# Patient Record
Sex: MALE | Race: BLACK OR AFRICAN AMERICAN | NOT HISPANIC OR LATINO | URBAN - METROPOLITAN AREA
[De-identification: names, ages, dates, MRNs, and addresses within clinical notes are randomized per-mention and may not be internally consistent; named-entity substitution may affect disease eponyms.]

---

## 2017-07-18 ENCOUNTER — OUTPATIENT (OUTPATIENT)
Dept: OUTPATIENT SERVICES | Facility: HOSPITAL | Age: 57
LOS: 1 days | Discharge: HOME | End: 2017-07-18

## 2017-07-18 DIAGNOSIS — I45.81 LONG QT SYNDROME: ICD-10-CM

## 2017-07-18 DIAGNOSIS — F11.20 OPIOID DEPENDENCE, UNCOMPLICATED: ICD-10-CM

## 2017-08-31 ENCOUNTER — OUTPATIENT (OUTPATIENT)
Dept: OUTPATIENT SERVICES | Facility: HOSPITAL | Age: 57
LOS: 1 days | Discharge: HOME | End: 2017-08-31

## 2017-08-31 DIAGNOSIS — I45.81 LONG QT SYNDROME: ICD-10-CM

## 2017-10-25 ENCOUNTER — OUTPATIENT (OUTPATIENT)
Dept: OUTPATIENT SERVICES | Facility: HOSPITAL | Age: 57
LOS: 1 days | Discharge: HOME | End: 2017-10-25

## 2017-10-25 DIAGNOSIS — I45.81 LONG QT SYNDROME: ICD-10-CM

## 2017-11-07 ENCOUNTER — OUTPATIENT (OUTPATIENT)
Dept: OUTPATIENT SERVICES | Facility: HOSPITAL | Age: 57
LOS: 1 days | Discharge: HOME | End: 2017-11-07

## 2017-11-07 DIAGNOSIS — I45.81 LONG QT SYNDROME: ICD-10-CM

## 2019-06-22 ENCOUNTER — EMERGENCY (EMERGENCY)
Facility: HOSPITAL | Age: 59
LOS: 0 days | Discharge: HOME | End: 2019-06-22
Attending: EMERGENCY MEDICINE | Admitting: EMERGENCY MEDICINE
Payer: MEDICARE

## 2019-06-22 VITALS
HEART RATE: 57 BPM | RESPIRATION RATE: 18 BRPM | SYSTOLIC BLOOD PRESSURE: 112 MMHG | TEMPERATURE: 98 F | DIASTOLIC BLOOD PRESSURE: 59 MMHG | OXYGEN SATURATION: 98 %

## 2019-06-22 VITALS
WEIGHT: 160.06 LBS | SYSTOLIC BLOOD PRESSURE: 115 MMHG | RESPIRATION RATE: 16 BRPM | OXYGEN SATURATION: 100 % | DIASTOLIC BLOOD PRESSURE: 64 MMHG | TEMPERATURE: 98 F | HEART RATE: 72 BPM

## 2019-06-22 DIAGNOSIS — K40.90 UNILATERAL INGUINAL HERNIA, WITHOUT OBSTRUCTION OR GANGRENE, NOT SPECIFIED AS RECURRENT: ICD-10-CM

## 2019-06-22 LAB
ALBUMIN SERPL ELPH-MCNC: 3.7 G/DL — SIGNIFICANT CHANGE UP (ref 3.5–5.2)
ALP SERPL-CCNC: 83 U/L — SIGNIFICANT CHANGE UP (ref 30–115)
ALT FLD-CCNC: 26 U/L — SIGNIFICANT CHANGE UP (ref 0–41)
ANION GAP SERPL CALC-SCNC: 10 MMOL/L — SIGNIFICANT CHANGE UP (ref 7–14)
APPEARANCE UR: CLEAR — SIGNIFICANT CHANGE UP
AST SERPL-CCNC: 28 U/L — SIGNIFICANT CHANGE UP (ref 0–41)
BASOPHILS # BLD AUTO: 0.02 K/UL — SIGNIFICANT CHANGE UP (ref 0–0.2)
BASOPHILS NFR BLD AUTO: 0.4 % — SIGNIFICANT CHANGE UP (ref 0–1)
BILIRUB DIRECT SERPL-MCNC: <0.2 MG/DL — SIGNIFICANT CHANGE UP (ref 0–0.2)
BILIRUB INDIRECT FLD-MCNC: >0 MG/DL — LOW (ref 0.2–1.2)
BILIRUB SERPL-MCNC: 0.2 MG/DL — SIGNIFICANT CHANGE UP (ref 0.2–1.2)
BILIRUB UR-MCNC: NEGATIVE — SIGNIFICANT CHANGE UP
BUN SERPL-MCNC: 20 MG/DL — SIGNIFICANT CHANGE UP (ref 10–20)
CALCIUM SERPL-MCNC: 9.4 MG/DL — SIGNIFICANT CHANGE UP (ref 8.5–10.1)
CHLORIDE SERPL-SCNC: 100 MMOL/L — SIGNIFICANT CHANGE UP (ref 98–110)
CO2 SERPL-SCNC: 30 MMOL/L — SIGNIFICANT CHANGE UP (ref 17–32)
COLOR SPEC: YELLOW — SIGNIFICANT CHANGE UP
CREAT SERPL-MCNC: 0.9 MG/DL — SIGNIFICANT CHANGE UP (ref 0.7–1.5)
DIFF PNL FLD: NEGATIVE — SIGNIFICANT CHANGE UP
EOSINOPHIL # BLD AUTO: 0.16 K/UL — SIGNIFICANT CHANGE UP (ref 0–0.7)
EOSINOPHIL NFR BLD AUTO: 3.2 % — SIGNIFICANT CHANGE UP (ref 0–8)
GLUCOSE SERPL-MCNC: 93 MG/DL — SIGNIFICANT CHANGE UP (ref 70–99)
GLUCOSE UR QL: NEGATIVE MG/DL — SIGNIFICANT CHANGE UP
HCT VFR BLD CALC: 38.4 % — LOW (ref 42–52)
HGB BLD-MCNC: 11.8 G/DL — LOW (ref 14–18)
IMM GRANULOCYTES NFR BLD AUTO: 0.4 % — HIGH (ref 0.1–0.3)
KETONES UR-MCNC: NEGATIVE — SIGNIFICANT CHANGE UP
LACTATE SERPL-SCNC: 1 MMOL/L — SIGNIFICANT CHANGE UP (ref 0.5–2.2)
LEUKOCYTE ESTERASE UR-ACNC: NEGATIVE — SIGNIFICANT CHANGE UP
LIDOCAIN IGE QN: 29 U/L — SIGNIFICANT CHANGE UP (ref 7–60)
LYMPHOCYTES # BLD AUTO: 2.7 K/UL — SIGNIFICANT CHANGE UP (ref 1.2–3.4)
LYMPHOCYTES # BLD AUTO: 53.5 % — HIGH (ref 20.5–51.1)
MCHC RBC-ENTMCNC: 28.3 PG — SIGNIFICANT CHANGE UP (ref 27–31)
MCHC RBC-ENTMCNC: 30.7 G/DL — LOW (ref 32–37)
MCV RBC AUTO: 92.1 FL — SIGNIFICANT CHANGE UP (ref 80–94)
MONOCYTES # BLD AUTO: 0.61 K/UL — HIGH (ref 0.1–0.6)
MONOCYTES NFR BLD AUTO: 12.1 % — HIGH (ref 1.7–9.3)
NEUTROPHILS # BLD AUTO: 1.54 K/UL — SIGNIFICANT CHANGE UP (ref 1.4–6.5)
NEUTROPHILS NFR BLD AUTO: 30.4 % — LOW (ref 42.2–75.2)
NITRITE UR-MCNC: NEGATIVE — SIGNIFICANT CHANGE UP
NRBC # BLD: 0 /100 WBCS — SIGNIFICANT CHANGE UP (ref 0–0)
PH UR: 7.5 — SIGNIFICANT CHANGE UP (ref 5–8)
PLATELET # BLD AUTO: 297 K/UL — SIGNIFICANT CHANGE UP (ref 130–400)
POTASSIUM SERPL-MCNC: 4.8 MMOL/L — SIGNIFICANT CHANGE UP (ref 3.5–5)
POTASSIUM SERPL-SCNC: 4.8 MMOL/L — SIGNIFICANT CHANGE UP (ref 3.5–5)
PROT SERPL-MCNC: 6.6 G/DL — SIGNIFICANT CHANGE UP (ref 6–8)
PROT UR-MCNC: NEGATIVE MG/DL — SIGNIFICANT CHANGE UP
RBC # BLD: 4.17 M/UL — LOW (ref 4.7–6.1)
RBC # FLD: 13.9 % — SIGNIFICANT CHANGE UP (ref 11.5–14.5)
SODIUM SERPL-SCNC: 140 MMOL/L — SIGNIFICANT CHANGE UP (ref 135–146)
SP GR SPEC: 1.01 — SIGNIFICANT CHANGE UP (ref 1.01–1.03)
UROBILINOGEN FLD QL: 0.2 MG/DL — SIGNIFICANT CHANGE UP (ref 0.2–0.2)
WBC # BLD: 5.05 K/UL — SIGNIFICANT CHANGE UP (ref 4.8–10.8)
WBC # FLD AUTO: 5.05 K/UL — SIGNIFICANT CHANGE UP (ref 4.8–10.8)

## 2019-06-22 PROCEDURE — 73630 X-RAY EXAM OF FOOT: CPT | Mod: 26,50

## 2019-06-22 PROCEDURE — 73610 X-RAY EXAM OF ANKLE: CPT | Mod: 26,LT

## 2019-06-22 PROCEDURE — 99284 EMERGENCY DEPT VISIT MOD MDM: CPT | Mod: 25

## 2019-06-22 PROCEDURE — 74177 CT ABD & PELVIS W/CONTRAST: CPT | Mod: 26

## 2019-06-22 RX ORDER — SODIUM CHLORIDE 9 MG/ML
1000 INJECTION INTRAMUSCULAR; INTRAVENOUS; SUBCUTANEOUS
Refills: 0 | Status: DISCONTINUED | OUTPATIENT
Start: 2019-06-22 | End: 2019-06-22

## 2019-06-22 RX ADMIN — SODIUM CHLORIDE 250 MILLILITER(S): 9 INJECTION INTRAMUSCULAR; INTRAVENOUS; SUBCUTANEOUS at 05:16

## 2019-06-22 RX ADMIN — SODIUM CHLORIDE 1000 MILLILITER(S): 9 INJECTION INTRAMUSCULAR; INTRAVENOUS; SUBCUTANEOUS at 09:30

## 2019-06-22 NOTE — ED ADULT NURSE NOTE - NSIMPLEMENTINTERV_GEN_ALL_ED
Implemented All Fall Risk Interventions:  [place] to call system. Call bell, personal items and telephone within reach. Instruct patient to call for assistance. Room bathroom lighting operational. Non-slip footwear when patient is off stretcher. Physically safe environment: no spills, clutter or unnecessary equipment. Stretcher in lowest position, wheels locked, appropriate side rails in place. Provide visual cue, wrist band, yellow gown, etc. Monitor gait and stability. Monitor for mental status changes and reorient to person, place, and time. Review medications for side effects contributing to fall risk. Reinforce activity limits and safety measures with patient and family.

## 2019-06-22 NOTE — ED PROVIDER NOTE - PHYSICAL EXAMINATION
CONSTITUTIONAL: Well-appearing; well-nourished; in no apparent distress.   EYES: PERRL; EOM intact.   NECK: Supple; non-tender; no cervical lymphadenopathy.   CARDIOVASCULAR: Normal S1, S2; no murmurs, rubs, or gallops.   RESPIRATORY: Normal chest excursion with respiration; breath sounds clear and equal bilaterally; no wheezes, rhonchi, or rales.  GI/: Normal bowel sounds; non-distended; + tender mass noted over L inguinal region. No testicular masses/tenderness. Non tender abdomen. No rebound or guarding. No CVA ttp  MS: No evidence of trauma or deformity.  Normal ROM in all four extremities; non-tender to palpation; distal pulses are normal.   SKIN: Normal for age and race; warm; No cellulitis changes/erythema/warmth  NEURO/PSYCH: A & O x 4; grossly unremarkable. mood and manner are appropriate. Grooming and personal hygiene are appropriate.

## 2019-06-22 NOTE — ED PROVIDER NOTE - TOBACCO USE
Problem: Patient Care Overview (Adult)  Goal: Plan of Care Review  Outcome: Ongoing (interventions implemented as appropriate)       Unknown if ever smoked

## 2019-06-22 NOTE — ED ADULT NURSE REASSESSMENT NOTE - NS ED NURSE REASSESS COMMENT FT1
Pt returned from CT in stable condition. IV fluids running. Urine sent to lab. XRay pending. Will continue to monitor.

## 2019-06-22 NOTE — ED PROVIDER NOTE - OBJECTIVE STATEMENT
59 year old M with hx of hep c, opioid abuse sent in by [facility] for evaluation of L inguinal hernia. Pt sts has had hernia x several months and is having worsening pain. His symptoms are worse with standing and better with laying down. He denies any abdominal/testicular pain, past surgeries, nausea, vomiting, bowel/bladder incontinence, skin changes/redness, fever/chills, chest pain, sob.

## 2019-06-22 NOTE — ED ADULT TRIAGE NOTE - CHIEF COMPLAINT QUOTE
pt BIBA for inguinal hernia, sent by [facility] for medical evaluation of hernia. EMS stated that pt "still has infection in hip"

## 2019-06-22 NOTE — ED PROVIDER NOTE - CLINICAL SUMMARY MEDICAL DECISION MAKING FREE TEXT BOX
CT no inguinal hernia, chronic L hip changes, pt ambulating independently, on my exam, pt has no inguinal hernia or tenderness, no masses, states it goes in and out, will give surgery clinic and ortho f/u as well, strict return precautions provided.

## 2019-06-22 NOTE — ED PROVIDER NOTE - ATTENDING CONTRIBUTION TO CARE
60 yo M with pmh of hep c, narcotic abuse sent in by [facility] for evaluation of L inguinal hernia.  pt says has been increasing in size and pain for a few months.  denies obstipation.  no n/v/d.  no fever.  exam: nad, ncat, perrl, eomi, mmm, rrr, ctab, abd soft, left inguinal hernia, palpable, reducible when laying down, ttp,nd aox3, imp: pt with left inguinal hernia, CT, surg consult

## 2019-06-22 NOTE — ED ADULT NURSE NOTE - CHIEF COMPLAINT QUOTE
pt BIBA for inguinal hernia, sent by [facility] for medical evaluation of hernia. EMS stated that pt "still has infection in hip"

## 2019-06-22 NOTE — ED PROVIDER NOTE - NSFOLLOWUPCLINICS_GEN_ALL_ED_FT
[facility]  Orthpedic  [address]  [place], [place]   Phone: [phone]  Fax:     [facility]  Surgery  [address] [place] [address]  Phone: [phone]  Fax:   Follow Up Time:

## 2019-06-22 NOTE — ED PROVIDER NOTE - NS ED ROS FT
Constitutional: no fever, chills, no recent weight loss, change in appetite or malaise  Eyes: no redness/discharge/pain/vision changes  Cardiac: No chest pain, SOB or edema.  Respiratory: No cough or respiratory distress  GI: + L inguinal pain. No nausea, vomiting, diarrhea or abdominal pain.  : No dysuria, frequency, urgency or hematuria  MS: no pain to back or extremities, no loss of ROM, no weakness  Neuro: No headache or weakness. No LOC.  Skin: No skin rash.  Except as documented in the HPI, all other systems are negative.

## 2019-06-23 PROCEDURE — 73502 X-RAY EXAM HIP UNI 2-3 VIEWS: CPT | Mod: 26,LT

## 2019-07-01 ENCOUNTER — EMERGENCY (EMERGENCY)
Facility: HOSPITAL | Age: 59
LOS: 0 days | Discharge: HOME | End: 2019-07-02
Attending: EMERGENCY MEDICINE | Admitting: EMERGENCY MEDICINE
Payer: MEDICARE

## 2019-07-01 VITALS
DIASTOLIC BLOOD PRESSURE: 70 MMHG | TEMPERATURE: 98 F | HEART RATE: 71 BPM | SYSTOLIC BLOOD PRESSURE: 120 MMHG | OXYGEN SATURATION: 99 % | RESPIRATION RATE: 20 BRPM

## 2019-07-01 DIAGNOSIS — R10.32 LEFT LOWER QUADRANT PAIN: ICD-10-CM

## 2019-07-01 DIAGNOSIS — K40.90 UNILATERAL INGUINAL HERNIA, WITHOUT OBSTRUCTION OR GANGRENE, NOT SPECIFIED AS RECURRENT: ICD-10-CM

## 2019-07-01 PROCEDURE — 99284 EMERGENCY DEPT VISIT MOD MDM: CPT

## 2019-07-01 PROCEDURE — 99053 MED SERV 10PM-8AM 24 HR FAC: CPT

## 2019-07-02 LAB
ALBUMIN SERPL ELPH-MCNC: 3.6 G/DL — SIGNIFICANT CHANGE UP (ref 3.5–5.2)
ALP SERPL-CCNC: 97 U/L — SIGNIFICANT CHANGE UP (ref 30–115)
ALT FLD-CCNC: 24 U/L — SIGNIFICANT CHANGE UP (ref 0–41)
ANION GAP SERPL CALC-SCNC: 10 MMOL/L — SIGNIFICANT CHANGE UP (ref 7–14)
APPEARANCE UR: CLEAR — SIGNIFICANT CHANGE UP
AST SERPL-CCNC: 24 U/L — SIGNIFICANT CHANGE UP (ref 0–41)
BACTERIA # UR AUTO: ABNORMAL /HPF
BASOPHILS # BLD AUTO: 0.02 K/UL — SIGNIFICANT CHANGE UP (ref 0–0.2)
BASOPHILS NFR BLD AUTO: 0.4 % — SIGNIFICANT CHANGE UP (ref 0–1)
BILIRUB SERPL-MCNC: 0.3 MG/DL — SIGNIFICANT CHANGE UP (ref 0.2–1.2)
BILIRUB UR-MCNC: NEGATIVE — SIGNIFICANT CHANGE UP
BUN SERPL-MCNC: 22 MG/DL — HIGH (ref 10–20)
CALCIUM SERPL-MCNC: 9.4 MG/DL — SIGNIFICANT CHANGE UP (ref 8.5–10.1)
CHLORIDE SERPL-SCNC: 103 MMOL/L — SIGNIFICANT CHANGE UP (ref 98–110)
CO2 SERPL-SCNC: 26 MMOL/L — SIGNIFICANT CHANGE UP (ref 17–32)
COLOR SPEC: YELLOW — SIGNIFICANT CHANGE UP
CREAT SERPL-MCNC: 0.9 MG/DL — SIGNIFICANT CHANGE UP (ref 0.7–1.5)
DIFF PNL FLD: NEGATIVE — SIGNIFICANT CHANGE UP
EOSINOPHIL # BLD AUTO: 0.16 K/UL — SIGNIFICANT CHANGE UP (ref 0–0.7)
EOSINOPHIL NFR BLD AUTO: 3.1 % — SIGNIFICANT CHANGE UP (ref 0–8)
GLUCOSE SERPL-MCNC: 98 MG/DL — SIGNIFICANT CHANGE UP (ref 70–99)
GLUCOSE UR QL: NEGATIVE — SIGNIFICANT CHANGE UP
HCT VFR BLD CALC: 36.1 % — LOW (ref 42–52)
HGB BLD-MCNC: 11.6 G/DL — LOW (ref 14–18)
IMM GRANULOCYTES NFR BLD AUTO: 0.4 % — HIGH (ref 0.1–0.3)
KETONES UR-MCNC: NEGATIVE — SIGNIFICANT CHANGE UP
LACTATE SERPL-SCNC: 0.7 MMOL/L — SIGNIFICANT CHANGE UP (ref 0.5–2.2)
LEUKOCYTE ESTERASE UR-ACNC: ABNORMAL
LYMPHOCYTES # BLD AUTO: 2.44 K/UL — SIGNIFICANT CHANGE UP (ref 1.2–3.4)
LYMPHOCYTES # BLD AUTO: 46.6 % — SIGNIFICANT CHANGE UP (ref 20.5–51.1)
MCHC RBC-ENTMCNC: 29.5 PG — SIGNIFICANT CHANGE UP (ref 27–31)
MCHC RBC-ENTMCNC: 32.1 G/DL — SIGNIFICANT CHANGE UP (ref 32–37)
MCV RBC AUTO: 91.9 FL — SIGNIFICANT CHANGE UP (ref 80–94)
MONOCYTES # BLD AUTO: 0.62 K/UL — HIGH (ref 0.1–0.6)
MONOCYTES NFR BLD AUTO: 11.8 % — HIGH (ref 1.7–9.3)
NEUTROPHILS # BLD AUTO: 1.98 K/UL — SIGNIFICANT CHANGE UP (ref 1.4–6.5)
NEUTROPHILS NFR BLD AUTO: 37.7 % — LOW (ref 42.2–75.2)
NITRITE UR-MCNC: NEGATIVE — SIGNIFICANT CHANGE UP
NRBC # BLD: 0 /100 WBCS — SIGNIFICANT CHANGE UP (ref 0–0)
PH UR: 6 — SIGNIFICANT CHANGE UP (ref 5–8)
PLATELET # BLD AUTO: 301 K/UL — SIGNIFICANT CHANGE UP (ref 130–400)
POTASSIUM SERPL-MCNC: 4.7 MMOL/L — SIGNIFICANT CHANGE UP (ref 3.5–5)
POTASSIUM SERPL-SCNC: 4.7 MMOL/L — SIGNIFICANT CHANGE UP (ref 3.5–5)
PROT SERPL-MCNC: 6.3 G/DL — SIGNIFICANT CHANGE UP (ref 6–8)
PROT UR-MCNC: NEGATIVE — SIGNIFICANT CHANGE UP
RBC # BLD: 3.93 M/UL — LOW (ref 4.7–6.1)
RBC # FLD: 14.5 % — SIGNIFICANT CHANGE UP (ref 11.5–14.5)
SODIUM SERPL-SCNC: 139 MMOL/L — SIGNIFICANT CHANGE UP (ref 135–146)
SP GR SPEC: >=1.03 — SIGNIFICANT CHANGE UP (ref 1.01–1.03)
UROBILINOGEN FLD QL: 1 (ref 0.2–0.2)
WBC # BLD: 5.24 K/UL — SIGNIFICANT CHANGE UP (ref 4.8–10.8)
WBC # FLD AUTO: 5.24 K/UL — SIGNIFICANT CHANGE UP (ref 4.8–10.8)
WBC UR QL: SIGNIFICANT CHANGE UP /HPF

## 2019-07-02 PROCEDURE — 76870 US EXAM SCROTUM: CPT | Mod: 26

## 2019-07-02 PROCEDURE — 74177 CT ABD & PELVIS W/CONTRAST: CPT | Mod: 26

## 2019-07-02 NOTE — ED PROVIDER NOTE - NSFOLLOWUPINSTRUCTIONS_ED_ALL_ED_FT
PLEASE FOLLOW-UP AT [facility] AS SCHEDULED ON 7/11/19.    Hernia, Adult    A hernia is the bulging of an organ or tissue through a weak spot in the muscles of the abdomen (abdominal wall). Hernias develop most often near the navel or groin.    There are many kinds of hernias. Common kinds include:    Femoral hernia. This kind of hernia develops under the groin in the upper thigh area.  Inguinal hernia. This kind of hernia develops in the groin or scrotum.  Umbilical hernia. This kind of hernia develops near the navel.  Hiatal hernia. This kind of hernia causes part of the stomach to be pushed up into the chest.  Incisional hernia. This kind of hernia bulges through a scar from an abdominal surgery.     CAUSES  This condition may be caused by:    Heavy lifting.  Coughing over a long period of time.  Straining to have a bowel movement.  An incision made during an abdominal surgery.   A birth defect (congenital defect).  Excess weight or obesity.  Smoking.  Poor nutrition.  Cystic fibrosis.  Excess fluid in the abdomen.  Undescended testicles.    SYMPTOMS  Symptoms of a hernia include:    A lump on the abdomen. This is the first sign of a hernia. The lump may become more obvious with standing, straining, or coughing. It may get bigger over time if it is not treated or if the condition causing it is not treated.  Pain. A hernia is usually painless, but it may become painful over time if treatment is delayed. The pain is usually dull and may get worse with standing or lifting heavy objects.    Sometimes a hernia gets tightly squeezed in the weak spot (strangulated) or stuck there (incarcerated) and causes additional symptoms. These symptoms may include:    Vomiting.  Nausea.  Constipation.  Irritability.    DIAGNOSIS  A hernia may be diagnosed with:    A physical exam. During the exam your health care provider may ask you to cough or to make a specific movement, because a hernia is usually more visible when you move.  Imaging tests. These can include:  X-rays.  Ultrasound.  CT scan.    TREATMENT  A hernia that is small and painless may not need to be treated. A hernia that is large or painful may be treated with surgery. Inguinal hernias may be treated with surgery to prevent incarceration or strangulation. Strangulated hernias are always treated with surgery, because lack of blood to the trapped organ or tissue can cause it to die.    Surgery to treat a hernia involves pushing the bulge back into place and repairing the weak part of the abdomen.    HOME CARE INSTRUCTIONS  Avoid straining.  Do not lift anything heavier than 10 lb (4.5 kg).  Lift with your leg muscles, not your back muscles. This helps avoid strain.  When coughing, try to cough gently.  Prevent constipation. Constipation leads to straining with bowel movements, which can make a hernia worse or cause a hernia repair to break down. You can prevent constipation by:  Eating a high-fiber diet that includes plenty of fruits and vegetables.  Drinking enough fluids to keep your urine clear or pale yellow. Aim to drink 6–8 glasses of water per day.  Using a stool softener as directed by your health care provider.  Lose weight, if you are overweight.  Do not use any tobacco products, including cigarettes, chewing tobacco, or electronic cigarettes. If you need help quitting, ask your health care provider.  Keep all follow-up visits as directed by your health care provider. This is important. Your health care provider may need to monitor your condition.    SEEK MEDICAL CARE IF:  You have swelling, redness, and pain in the affected area.  Your bowel habits change.    SEEK IMMEDIATE MEDICAL CARE IF:  You have a fever.  You have abdominal pain that is getting worse.  You feel nauseous or you vomit.  You cannot push the hernia back in place by gently pressing on it while you are lying down.  The hernia:  Changes in shape or size.  Is stuck outside the abdomen.  Becomes discolored.  Feels hard or tender.    ADDITIONAL NOTES AND INSTRUCTIONS    Please follow up with your Primary MD in 24-48 hr.  Seek immediate medical care for any new/worsening signs or symptoms.

## 2019-07-02 NOTE — ED PROVIDER NOTE - NSFOLLOWUPCLINICS_GEN_ALL_ED_FT
[facility]  Surgery  [address] [place] [address]  Phone: [phone]  Fax:   Follow Up Time:

## 2019-07-02 NOTE — ED ADULT NURSE NOTE - OBJECTIVE STATEMENT
Pt is C/O back pains that radiates to his left leg and groin for 1 month. Pt said he has hernia in the L groin for long time and is very painful now, was refer from [facility]. no N/V

## 2019-07-02 NOTE — ED PROVIDER NOTE - OBJECTIVE STATEMENT
59y m from [facility] p/w L groin pain x sev days. Has known hernia, with outpt Surg Clinic f/u appx this month. C/o worsening pain to area x few days, referred to ED for further mgmt. Denies f/c, cp/sob, nvd, flank pain, urinary sx, penile discharge or rash.

## 2019-07-02 NOTE — ED PROVIDER NOTE - PHYSICAL EXAMINATION
VITAL SIGNS: I have reviewed nursing notes and confirm.  CONSTITUTIONAL: Well-developed; well-nourished; in no acute distress.  SKIN: Skin exam is warm and dry, no acute rash.  HEAD: Normocephalic; atraumatic.  EYES: PERRL, EOM intact; conjunctiva and sclera clear.  ENT: No nasal discharge; airway clear.  NECK: Supple; non tender.  CARD: S1, S2 normal; no murmurs, gallops, or rubs. Regular rate and rhythm.  RESP: No wheezes, rales or rhonchi.  ABD: Normal bowel sounds; soft; non-distended; +L inguinal ttp; no palpable hepatosplenomegaly or masses, no rgr.  exam wnl, no testicular pain/swelling, no penile discharge or rash, no palpable inguinal hernias, no lad.  BACK: non-tender, no CVAT  EXT: Normal ROM. No clubbing, cyanosis or edema. DPI.  NEURO: Alert, oriented. Grossly unremarkable. No focal deficits.  PSYCH: Cooperative, appropriate.

## 2019-07-02 NOTE — ED PROVIDER NOTE - CLINICAL SUMMARY MEDICAL DECISION MAKING FREE TEXT BOX
L inguinal hernia - results d/w pt & copies given, return precautions discussed, encouraged outpt Surg Clinic f/u this month as previously scheduled

## 2019-07-03 LAB
CULTURE RESULTS: NO GROWTH — SIGNIFICANT CHANGE UP
SPECIMEN SOURCE: SIGNIFICANT CHANGE UP

## 2019-07-08 ENCOUNTER — EMERGENCY (EMERGENCY)
Facility: HOSPITAL | Age: 59
LOS: 0 days | Discharge: HOME | End: 2019-07-08
Attending: EMERGENCY MEDICINE | Admitting: EMERGENCY MEDICINE
Payer: MEDICAID

## 2019-07-08 VITALS
SYSTOLIC BLOOD PRESSURE: 122 MMHG | HEART RATE: 64 BPM | WEIGHT: 160.06 LBS | RESPIRATION RATE: 18 BRPM | DIASTOLIC BLOOD PRESSURE: 76 MMHG | TEMPERATURE: 98 F | OXYGEN SATURATION: 96 % | HEIGHT: 70 IN

## 2019-07-08 DIAGNOSIS — R10.9 UNSPECIFIED ABDOMINAL PAIN: ICD-10-CM

## 2019-07-08 DIAGNOSIS — K40.91 UNILATERAL INGUINAL HERNIA, WITHOUT OBSTRUCTION OR GANGRENE, RECURRENT: ICD-10-CM

## 2019-07-08 PROCEDURE — 99284 EMERGENCY DEPT VISIT MOD MDM: CPT

## 2019-07-08 RX ORDER — KETOROLAC TROMETHAMINE 30 MG/ML
30 SYRINGE (ML) INJECTION ONCE
Refills: 0 | Status: DISCONTINUED | OUTPATIENT
Start: 2019-07-08 | End: 2019-07-08

## 2019-07-08 RX ADMIN — Medication 30 MILLIGRAM(S): at 17:18

## 2019-07-08 NOTE — ED ADULT TRIAGE NOTE - CHIEF COMPLAINT QUOTE
BIBA via [facility] from [facility]- ems states pt has a history of abdominal hernia and pain radiating to the left side of body- states pain is radiating to the left and right side of abdomen and to right leg- pain began 6 months and worsened 1 month

## 2019-07-08 NOTE — ED PROVIDER NOTE - PHYSICAL EXAMINATION
Physical Exam    Vital Signs: I have reviewed the initial vital signs.  Constitutional: well-nourished, appears stated age, no acute distress  Eyes: Conjunctiva pink, Sclera clear,  Cardiovascular: S1 and S2, regular rate, regular rhythm, well-perfused extremities, radial pulses equal and 2+  Respiratory: unlabored respiratory effort, clear to auscultation bilaterally no wheezing, rales and rhonchi  Gastrointestinal: soft, non-tender abdomen, no pulsatile mass, normal bowl sounds  : Chaperoned by rn, nl testicles, no ttp, left inguinal herina reducible, not ttp, no redness, no incarceration or strnagulation  Musculoskeletal: supple neck, no lower extremity edema, no midline tenderness  Integumentary: warm, dry, no rash, no redness to left inguinal region  Neurologic: awake, alert, cranial nerves II-XII grossly intact, extremities’ motor and sensory functions grossly intact

## 2019-07-08 NOTE — ED PROVIDER NOTE - NSFOLLOWUPCLINICS_GEN_ALL_ED_FT
[facility]  Surgery  [address] [place] [address]  Phone: [phone]  Fax:   Follow Up Time: 4-6 Days

## 2019-07-08 NOTE — ED PROVIDER NOTE - CLINICAL SUMMARY MEDICAL DECISION MAKING FREE TEXT BOX
60yo M presenting with recurrent L inguinal hernia pain- soft, reducible, not incarcerated, not strangulated- improved with analgesia. Pt has surgery follow-up on 11th. Patient comfortable with discharge and follow-up outpatient, strict return precautions given. Patient endorses understanding of all of this and aware that they can return at any time for new or concerning symptoms. No further questions or concerns at this time

## 2019-07-08 NOTE — ED PROVIDER NOTE - CARE PLAN
Principal Discharge DX:	Unilateral recurrent inguinal hernia without obstruction or gangrene Principal Discharge DX:	Unilateral recurrent inguinal hernia without obstruction or gangrene  Goal:	f/u surgery

## 2019-07-08 NOTE — ED PROVIDER NOTE - ATTENDING CONTRIBUTION TO CARE
58yo M history of substance abuse currently in rehab presenting with recurrent L inguinal hernia pain- per pt, pain for several months, worse when standing straining, always reducible. Was evaluated 7/2, labs, CT, US, showing small L inguinal hernia, non-complicated. Per pt, nothing new regarding pt, just "wants it taken care of"- has appt with surgery on 7/11. No other complaints- no fevers/chills/n/v/d, chest pain, shortness of breath, abdominal pain, numbness/focal weakness, back pain, dysuria/hematuria.  Constitutional: Well appearing. No acute distress. Non toxic.   Eyes: PERRLA. Extraocular movements intact, no entrapment. Conjunctiva normal.   ENT: No nasal discharge. Moist mucus membranes.  Neck: Supple, non tender, full range of motion.  CV: RRR no murmurs, rubs, or gallops. +S1S2.   Pulm: Clear to auscultation bilaterally. Normal work of breathing.  Abd: soft NT ND +BS.    exam- +L inguinal masses, small, soft, reducible, no overlying skin changes, no penile lesions/tenderness/drainage, no testicular masses, edema, tenderness, +b/l cremasterics intact   Ext: Warm and well perfused x4, moving all extremities, no edema.   Psy: Cooperative, appropriate.   Skin: Warm, dry, no rash  Neuro: CN2-12 grossly intact no sensory or motor deficits throughout, no drift, no ataxia    a/p: L inguinal hernia- not incarcerated or strangulated, soft, reducible- will add compression, analgesia, surgery follow-up

## 2019-07-08 NOTE — ED PROVIDER NOTE - OBJECTIVE STATEMENT
60 yo male, pmh of left inguinal hernia, presents to ed for left groin pain. Pt states hernia present for 6 months, has schedule surgery for end of this week, pain aching, beter with otc meds. Denies redness, fever, chills, testicular pain, abd pain, nvd, cp, sob, rash, dysuria.

## 2019-07-08 NOTE — ED PROVIDER NOTE - NS ED ROS FT
Constitutional: (-) fever, (-) chills  Eyes: (-) visual changes  Cardiovascular: (-) chest pain, (-) syncope  Respiratory: (-) cough, (-) shortness of breath, (-) dyspnea,   Gastrointestinal: (-) vomiting, (-) diarrhea, (-)nausea,(+left groin pain  Musculoskeletal: (-) neck pain, (-) back pain, (-) joint pain,  Integumentary: (-) rash, (-) edema, (-) bruises, (-)color changes,  Neurological: (-) headache, (-) dizziness, (-) tingling, (-)numbness,   Peripheral Vascular: (-) pain while walking, (-) leg swelling, (-) color changes  : , (-)dysuria,  Allergic/Immunologic: (-) pruritus

## 2023-06-29 NOTE — ED ADULT NURSE NOTE - TEMPLATE
Back Pain Cryotherapy Text: The wound bed was treated with cryotherapy after the biopsy was performed.
